# Patient Record
Sex: MALE | Race: OTHER | Employment: OTHER | URBAN - METROPOLITAN AREA
[De-identification: names, ages, dates, MRNs, and addresses within clinical notes are randomized per-mention and may not be internally consistent; named-entity substitution may affect disease eponyms.]

---

## 2017-05-03 NOTE — PATIENT DISCUSSION
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 The patient has been treated with botox for this problem in the past.  The patient has been well aware of the risks and benefits of this treatment in the past.  They have signed an informed consent regarding the same issues today. Further botox treatment is deemed appropriate to manage their functional spasm today. The patient will follow up on an as needed basis when their spasms reoccurs.

## 2017-05-03 NOTE — PATIENT DISCUSSION
BOTOX for HEMIFACIAL SPASM (25 units):  I have explained the indications, alternatives, risks and potential benefits of the procedure to the patient who fully understands and has given informed consent. A series of Botox (botulinum toxin type A) injections was performed according to the diagram documented as part of this encounter. The patient tolerated the procedure well.

## 2018-05-18 ENCOUNTER — NEW PATIENT COMPREHENSIVE (OUTPATIENT)
Dept: URBAN - METROPOLITAN AREA CLINIC 39 | Facility: CLINIC | Age: 68
End: 2018-05-18

## 2018-05-18 DIAGNOSIS — H40.013: ICD-10-CM

## 2018-05-18 DIAGNOSIS — H47.20: ICD-10-CM

## 2018-05-18 DIAGNOSIS — H04.123: ICD-10-CM

## 2018-05-18 DIAGNOSIS — H25.813: ICD-10-CM

## 2018-05-18 PROCEDURE — G8427 DOCREV CUR MEDS BY ELIG CLIN: HCPCS

## 2018-05-18 PROCEDURE — 92004 COMPRE OPH EXAM NEW PT 1/>: CPT

## 2018-05-18 PROCEDURE — 1036F TOBACCO NON-USER: CPT

## 2018-05-18 PROCEDURE — 92015 DETERMINE REFRACTIVE STATE: CPT

## 2018-05-18 PROCEDURE — 92133 CPTRZD OPH DX IMG PST SGM ON: CPT

## 2018-05-18 ASSESSMENT — VISUAL ACUITY
OS_CC: 20/20
OS_SC: 20/40+2
OD_CC: J4
OS_SC: J12
OD_SC: J12
OD_CC: 20/30-1
OS_CC: J3
OD_SC: 20/60-2

## 2018-05-18 ASSESSMENT — TONOMETRY
OD_IOP_MMHG: 14
OS_IOP_MMHG: 14

## 2019-06-14 ENCOUNTER — ESTABLISHED COMPREHENSIVE EXAM (OUTPATIENT)
Dept: URBAN - METROPOLITAN AREA CLINIC 39 | Facility: CLINIC | Age: 69
End: 2019-06-14

## 2019-06-14 DIAGNOSIS — H52.03: ICD-10-CM

## 2019-06-14 DIAGNOSIS — H04.123: ICD-10-CM

## 2019-06-14 DIAGNOSIS — H40.013: ICD-10-CM

## 2019-06-14 DIAGNOSIS — H47.20: ICD-10-CM

## 2019-06-14 DIAGNOSIS — H52.203: ICD-10-CM

## 2019-06-14 DIAGNOSIS — H02.881: ICD-10-CM

## 2019-06-14 DIAGNOSIS — H52.4: ICD-10-CM

## 2019-06-14 DIAGNOSIS — H02.884: ICD-10-CM

## 2019-06-14 DIAGNOSIS — H25.813: ICD-10-CM

## 2019-06-14 PROCEDURE — 92014 COMPRE OPH EXAM EST PT 1/>: CPT

## 2019-06-14 PROCEDURE — 68761S PUNCTUM PLUG / SILICONE,EACH

## 2019-06-14 PROCEDURE — 92015 DETERMINE REFRACTIVE STATE: CPT

## 2019-06-14 PROCEDURE — A4263 PERMANENT TEAR DUCT PLUG: HCPCS

## 2019-06-14 PROCEDURE — 76514 ECHO EXAM OF EYE THICKNESS: CPT

## 2019-06-14 PROCEDURE — 92133 CPTRZD OPH DX IMG PST SGM ON: CPT

## 2019-06-14 RX ORDER — MINERAL OIL 2; 2 MG/.4ML; MG/.4ML: 1 EMULSION OPHTHALMIC

## 2019-06-14 ASSESSMENT — VISUAL ACUITY
OD_SC: 20/40
OS_SC: 20/30
OD_SC: J10
OS_CC: J2-
OD_CC: J1-
OS_SC: J12-
OD_CC: 20/20-2
OS_CC: 20/20

## 2019-06-14 ASSESSMENT — TONOMETRY
OS_IOP_MMHG: 17
OD_IOP_MMHG: 17